# Patient Record
Sex: FEMALE | Race: WHITE
[De-identification: names, ages, dates, MRNs, and addresses within clinical notes are randomized per-mention and may not be internally consistent; named-entity substitution may affect disease eponyms.]

---

## 2020-01-23 LAB
ABSOLUTE BASOPHILS: 0.1 THOU/UL (ref 0–0.2)
ABSOLUTE EOSINOPHILS: 0.1 THOU/UL (ref 0–0.7)
ABSOLUTE MONOCYTES: 0.5 THOU/UL (ref 0–1.2)
ALBUMIN SERPL-MCNC: 3.7 G/DL (ref 3.4–5)
ALP SERPL-CCNC: 122 U/L (ref 46–116)
ALT SERPL-CCNC: 26 U/L (ref 30–65)
ANION GAP SERPL CALC-SCNC: 7 MMOL/L (ref 7–16)
APTT BLD: 29 SECONDS (ref 25–31.3)
AST SERPL-CCNC: 16 U/L (ref 15–37)
BASOPHILS NFR BLD AUTO: 1.2 %
BILIRUB SERPL-MCNC: 0.4 MG/DL
BUN SERPL-MCNC: 17 MG/DL (ref 7–18)
CALCIUM SERPL-MCNC: 9.8 MG/DL (ref 8.5–10.1)
CHLORIDE SERPL-SCNC: 105 MMOL/L (ref 98–107)
CO2 SERPL-SCNC: 29 MMOL/L (ref 21–32)
CREAT SERPL-MCNC: 0.9 MG/DL (ref 0.6–1.3)
EOSINOPHIL NFR BLD: 1.8 %
ESR (SEDRATE): 10 MM/HR (ref 0–30)
GLUCOSE SERPL-MCNC: 99 MG/DL (ref 70–99)
GRANULOCYTES NFR BLD MANUAL: 60.5 %
HCT VFR BLD CALC: 43.9 % (ref 37–47)
HGB BLD-MCNC: 14.8 GM/DL (ref 12–15)
INR PPP: 1
LYMPHOCYTES # BLD: 2.1 THOU/UL (ref 0.8–5.3)
LYMPHOCYTES NFR BLD AUTO: 29.1 %
MCH RBC QN AUTO: 28.2 PG (ref 26–34)
MCHC RBC AUTO-ENTMCNC: 33.7 G/DL (ref 28–37)
MCV RBC: 83.8 FL (ref 80–100)
MONOCYTES NFR BLD: 7.4 %
MPV: 7 FL. (ref 7.2–11.1)
NEUTROPHILS # BLD: 4.5 THOU/UL (ref 1.6–8.1)
NUCLEATED RBCS: 0 /100WBC
PLATELET COUNT*: 302 THOU/UL (ref 150–400)
POTASSIUM SERPL-SCNC: 3.9 MMOL/L (ref 3.5–5.1)
PROT SERPL-MCNC: 7.5 G/DL (ref 6.4–8.2)
PROTHROMBIN TIME: 10.7 SECONDS (ref 9.2–11.5)
RBC # BLD AUTO: 5.24 MIL/UL (ref 4.2–5)
RDW-CV: 12.9 % (ref 10.5–14.5)
SODIUM SERPL-SCNC: 141 MMOL/L (ref 136–145)
WBC # BLD AUTO: 7.4 THOU/UL (ref 4–11)

## 2020-01-23 NOTE — EKG
[place] [address]
Phone:  [phone]                     ELECTROCARDIOGRAM REPORT      
_______________________________________________________________________________
 
Name:       LOCO SANCHEZ               Room:                      PRE Ariella 
M.R.#:  [id number]      Account #:      [id number]  
Admission:               Attend Phys:    Raj Duron, 
Discharge:               Date of Birth:  [birth year]  
         Report #: [id number]
    [id number]
_______________________________________________________________________________
THIS REPORT FOR:  //name//                      
 
                          [facility]
                                       
Test Date:    2020               Test Time:    09:21:05
Pat Name:     LOCO SANCHEZ           Department:   
Patient ID:   [id number]            Room:          
Gender:       F                        Technician:   
:          1960               Requested By: Raj Duron
Order Number: [id number]    Fifi MD:   Abdullahi Mota
                                 Measurements
Intervals                              Axis          
Rate:         74                       P:            25
VA:           141                      QRS:          -68
QRSD:         98                       T:            59
QT:           409                                    
QTc:          454                                    
                           Interpretive Statements
Sinus rhythm
LAD, consider left anterior fascicular block
RSR' in V1 or V2, right VCD 
No previous ECG available for comparison
 
Electronically Signed On 2020 10:30:46 CST by Abdullahi Mota
[email or web address]
 
 
 
 
 
 
 
 
 
 
 
 
 
 
 
 
 
 
  <ELECTRONICALLY SIGNED>
                                           By: Abdullahi Mota MD, [facility]      
  20     1030
D: 20   _____________________________________
T: 20   Abdullahi Mota MD, FACC        /EPI

## 2020-01-24 LAB
EST. AVERAGE GLUCOSE BLD GHB EST-MCNC: 114 MG/DL
GLYCOHEMOGLOBIN (HGB A1C): 5.6 % (ref 4.8–5.6)

## 2020-01-30 ENCOUNTER — HOSPITAL ENCOUNTER (INPATIENT)
Dept: HOSPITAL 96 - M.TBA | Age: 60
LOS: 1 days | Discharge: HOME HEALTH | DRG: 470 | End: 2020-01-31
Attending: INTERNAL MEDICINE | Admitting: INTERNAL MEDICINE
Payer: COMMERCIAL

## 2020-01-30 VITALS — DIASTOLIC BLOOD PRESSURE: 67 MMHG | SYSTOLIC BLOOD PRESSURE: 126 MMHG

## 2020-01-30 VITALS — DIASTOLIC BLOOD PRESSURE: 75 MMHG | SYSTOLIC BLOOD PRESSURE: 137 MMHG

## 2020-01-30 VITALS — WEIGHT: 145 LBS | BODY MASS INDEX: 28.47 KG/M2 | HEIGHT: 60 IN

## 2020-01-30 VITALS — SYSTOLIC BLOOD PRESSURE: 136 MMHG | DIASTOLIC BLOOD PRESSURE: 73 MMHG

## 2020-01-30 VITALS — DIASTOLIC BLOOD PRESSURE: 82 MMHG | SYSTOLIC BLOOD PRESSURE: 128 MMHG

## 2020-01-30 VITALS — SYSTOLIC BLOOD PRESSURE: 112 MMHG | DIASTOLIC BLOOD PRESSURE: 57 MMHG

## 2020-01-30 VITALS — DIASTOLIC BLOOD PRESSURE: 72 MMHG | SYSTOLIC BLOOD PRESSURE: 132 MMHG

## 2020-01-30 DIAGNOSIS — Z88.0: ICD-10-CM

## 2020-01-30 DIAGNOSIS — M16.12: Primary | ICD-10-CM

## 2020-01-30 DIAGNOSIS — Z79.01: ICD-10-CM

## 2020-01-30 DIAGNOSIS — Z72.89: ICD-10-CM

## 2020-01-30 DIAGNOSIS — Z87.891: ICD-10-CM

## 2020-01-30 PROCEDURE — 0SRB03Z REPLACEMENT OF LEFT HIP JOINT WITH CERAMIC SYNTHETIC SUBSTITUTE, OPEN APPROACH: ICD-10-PCS | Performed by: ORTHOPAEDIC SURGERY

## 2020-01-30 NOTE — OP
[address]  [address]                    OPERATIVE REPORT              
_______________________________________________________________________________
 
Name:       LOCO SANCHEZ DEVANG               Room:           [address] [place]  
[facility]#:  [id number]      Account #:      [id number]  
Admission:  01/30/20     Attend Phys:    GUANACO Aj
Discharge:               Date of Birth:  [birth year]  
         Report #: [id number]
                                                                     [id number]  
_______________________________________________________________________________
THIS REPORT FOR:  //name//                      
 
CC: Raj Light
 
DATE OF SERVICE:  01/30/2020
 
 
PREOPERATIVE DIAGNOSIS:  Left hip severe osteoarthritis.
 
POSTOPERATIVE DIAGNOSIS:  Left hip severe osteoarthritis.
 
OPERATION PERFORMED:  Left total hip arthroplasty.
 
SURGEON:  Raj Duron DO
 
FIRST ASSISTANT:  David Morris DO
 
SECOND ASSISTANT:  Samson Webb DO.
 
ESTIMATED BLOOD LOSS:  300 mL.
 
ANTIBIOTICS:  600 mg of clindamycin given within 1 hour of skin incision.
 
ANESTHESIA:  General plus local anesthetic injected around the operative field.
 
DRAINS:  None.
 
SPECIMENS:  None.
 
COMPLICATIONS:  None.
 
CONDITION:  Stable.
 
DISPOSITION:  PACU to joint and spine floor.
 
OPERATIVE IMPLANTS:  Include the MicroPort total hip arthroplasty system with
the following components:
1.  A Profemur preserve classic stem size 3 with 8-degree varus neck.
2.  A 50 mm prime Biofoam quad shell.
3.  A 36 mm prime A class XLPE liner.
4.  A size 36 mm Biolox delta femoral head component with a long neck length
(+3.5 mm).
 
OPERATIVE INDICATIONS:  The patient is a pleasant 59-year-old female who
 
 
 
[facility] 
[address] [place]  [address]                    OPERATIVE REPORT              
_______________________________________________________________________________
 
Name:       LOCO SANCHEZ DEVANG               Room:           [address] [place]  
[facility]#:  [id number]      Account #:      [id number]  
Admission:  01/30/20     Attend Phys:    GUANACO Aj
Discharge:               Date of Birth:  [birth year]  
         Report #: [id number]
                                                                     [id number]  
_______________________________________________________________________________
reported to the Orthopedic Clinic today for left hip and knee pain.  Radiographs
were obtained and showed severe osteoarthritic changes of both left knee and hip
as well as the right side.  She had tried and failed conservative treatment over
the last following years including activity modifications, anti-inflammatories,
and exercises.  Despite trying these things, she continued to have severe pain
which is now beginning to interfere with her quality of life and just making it
very difficult for her to ambulate.  We discussed with her treatment options. 
We reviewed the risks, indications, and treatment alternatives and ultimately
decided on left total hip arthroplasty.  Her informed consent was signed.
 
DESCRIPTION OF PROCEDURE:  The patient was taken to the operating suite and
placed on the operating table in supine position.  General anesthesia was then
induced.  She was then placed in the lateral decubitus position with the left
side up.  The patient was then secured to the pegboard, and bony prominences
were well padded and axillary roll was placed.  Left hip region was then
sterilely prepped and draped free in the usual fashion.  A timeout was then
performed to confirm that the safety checklist had been completed and all the OR
personnel were in agreement.  A standard anterolateral curvilinear incision was
marked out over the lateral aspect of the hip, approximately 12 cm in length
over the anterior aspect of the greater trochanter extending in line with the
femur.  A sharp dissection was then carried down through the skin and
subcutaneous tissue.  Electrocautery was used to maintain hemostasis.  The
tensor fascia was identified and was cleared with a key elevator.  The fascia
was then incised and was split in line with the incision both proximally and
distally.  The Charnley retractor was then placed.  The gluteus medius tendon
was then identified.  Anterior third of the tendon was split to the tip of the
greater trochanter.  The anterior muscle fibers were then also split and
retracted cephalad.  This exposed the underlying capsule, and the leg at this
time was externally rotated and dropped into the side bag.  H capsulotomy was
then performed further exposing the femoral head and neck.  The leg was brought
into further external rotation and flexion dislocating the femoral head and
neck.  The capsule was further retracted to expose the lesser trochanter.  The
femoral neck cut was then made just above the lesser trochanter extending to the
saddle region.  Femoral head and neck were then removed.  The leg was then
brought back on the table exposing the acetabulum.  The remaining labral tissue
was excised.  There were large osteophytes on the superior and inferior rim of
the acetabulum, and these were removed with a rongeur.  The retractors were then
placed exposing the acetabulum, and the acetabulum was then sequentially reamed
up to a size 50.  There was good bleeding bone throughout the acetabulum at this
time.  A trial 50 mm shell was then placed to confirm appropriate version.  This
was then removed.  The wound was copiously irrigated.  The final shell was then
impacted in the appropriate positioning making sure the appropriate version and
abduction was confirmed by direct visualization as well as utilizing the
external guide.  The shell had excellent purchase within the bone.  We then
proceeded to the femoral portion of the procedure.  The leg was once again
externally rotated and flexed and dropped into the bag.  A cobra retractor was
 
 
 
[address]  [address]                    OPERATIVE REPORT              
_______________________________________________________________________________
 
Name:       LOCO SANCHEZ               Room:           [address] [place]  
[facility]#:  [id number]      Account #:      [id number]  
Admission:  01/30/20     Attend Phys:    GUANACO Aj
Discharge:               Date of Birth:  [birth year]  
         Report #: [id number]
                                                                     [id number]  
_______________________________________________________________________________
placed.  We then gained access to the intramedullary canal of the femur with a
box osteotome followed by sequential reaming.  We then sequentially broached the
femur up to a size 3 with the Profemur preserve stem.  This gave an excellent
fit and fill within the proximal femur and also it was held in the appropriate
anteversion.  Multiple trials were then performed with a different size neck
lengths and offsets, and ultimately the high offset and high neck length gave an
excellent stability through range of motion.  Intraoperative x-rays were then
taken at this time and confirmed that all the components were in the appropriate
positioning and of the appropriate size.  The trial components were then
dislocated and removed.  The final femoral stem component was then placed
followed by impaction of the final femoral head onto the Tan taper.  The hip
was then finally reduced and once again had excellent stability throughout range
of motion and minimal shuck.  A layered closure was then performed beginning
with #1 Vicryl suture in a figure-of-eight fashion on the capsular layer.  The
gluteus medius tendon was then re-approximated and repaired to the greater
trochanter using FiberWire suture in a figure-of-eight fashion.  This was then
oversewn with a #1 Vicryl suture.  The tensor fascia was then re-approximated
with a #1 Vicryl suture in a figure-of-eight fashion followed by 2-0 Vicryl in a
buried fashion.  A 3-0 Stratafix was then used on the subcuticular layer and
skin glue was applied to the incision and allowed to dry, followed by
application of a Mepilex dressing.  Abduction pillow was placed.  The patient
was transferred to the [facility] and awakened from general anesthetic in stable
condition with no apparent complications.  Sponge and needle counts were
reported correct x 2 per the OR personnel.
 
ATTESTATION:  Dr. Duron was present for all critical aspects of surgery.
 
 
 
 
 
 
 
 
 
 
 
 
 
 
 
 
 
 
<ELECTRONICALLY SIGNED>
                                        By:  Raj Duron DO       
01/30/20     1304
D: 01/30/20 1149_______________________________________
T: 01/30/20 1220Raj Duron DO          /nt

## 2020-01-31 VITALS — DIASTOLIC BLOOD PRESSURE: 61 MMHG | SYSTOLIC BLOOD PRESSURE: 107 MMHG

## 2020-01-31 VITALS — DIASTOLIC BLOOD PRESSURE: 54 MMHG | SYSTOLIC BLOOD PRESSURE: 100 MMHG

## 2020-01-31 VITALS — SYSTOLIC BLOOD PRESSURE: 129 MMHG | DIASTOLIC BLOOD PRESSURE: 90 MMHG

## 2020-01-31 VITALS — SYSTOLIC BLOOD PRESSURE: 107 MMHG | DIASTOLIC BLOOD PRESSURE: 61 MMHG

## 2020-01-31 LAB
ANION GAP SERPL CALC-SCNC: 6 MMOL/L (ref 7–16)
BUN SERPL-MCNC: 18 MG/DL (ref 7–18)
CALCIUM SERPL-MCNC: 8.4 MG/DL (ref 8.5–10.1)
CHLORIDE SERPL-SCNC: 109 MMOL/L (ref 98–107)
CO2 SERPL-SCNC: 26 MMOL/L (ref 21–32)
CREAT SERPL-MCNC: 0.9 MG/DL (ref 0.6–1.3)
GLUCOSE SERPL-MCNC: 126 MG/DL (ref 70–99)
HCT VFR BLD CALC: 31.3 % (ref 37–47)
HGB BLD-MCNC: 10.5 GM/DL (ref 12–15)
POTASSIUM SERPL-SCNC: 4 MMOL/L (ref 3.5–5.1)
SODIUM SERPL-SCNC: 141 MMOL/L (ref 136–145)

## 2020-05-21 LAB
ABSOLUTE BASOPHILS: 0.1 THOU/UL (ref 0–0.2)
ABSOLUTE EOSINOPHILS: 0.1 THOU/UL (ref 0–0.7)
ABSOLUTE MONOCYTES: 0.5 THOU/UL (ref 0–1.2)
ALBUMIN SERPL-MCNC: 3.6 G/DL (ref 3.4–5)
ALP SERPL-CCNC: 134 U/L (ref 46–116)
ALT SERPL-CCNC: 26 U/L (ref 30–65)
ANION GAP SERPL CALC-SCNC: 7 MMOL/L (ref 7–16)
APTT BLD: 29.7 SECONDS (ref 25–31.3)
AST SERPL-CCNC: 21 U/L (ref 15–37)
BASOPHILS NFR BLD AUTO: 1.2 %
BILIRUB SERPL-MCNC: 0.3 MG/DL
BUN SERPL-MCNC: 18 MG/DL (ref 7–18)
CALCIUM SERPL-MCNC: 9.4 MG/DL (ref 8.5–10.1)
CHLORIDE SERPL-SCNC: 103 MMOL/L (ref 98–107)
CO2 SERPL-SCNC: 29 MMOL/L (ref 21–32)
CREAT SERPL-MCNC: 0.8 MG/DL (ref 0.6–1.3)
EOSINOPHIL NFR BLD: 1.9 %
ESR (SEDRATE): 6 MM/HR (ref 0–30)
GLUCOSE SERPL-MCNC: 101 MG/DL (ref 70–99)
GRANULOCYTES NFR BLD MANUAL: 57.5 %
HCT VFR BLD CALC: 44 % (ref 37–47)
HGB BLD-MCNC: 14.7 GM/DL (ref 12–15)
INR PPP: 1.1
LYMPHOCYTES # BLD: 2.3 THOU/UL (ref 0.8–5.3)
LYMPHOCYTES NFR BLD AUTO: 32.7 %
MCH RBC QN AUTO: 26.8 PG (ref 26–34)
MCHC RBC AUTO-ENTMCNC: 33.3 G/DL (ref 28–37)
MCV RBC: 80.5 FL (ref 80–100)
MONOCYTES NFR BLD: 6.7 %
MPV: 7.3 FL. (ref 7.2–11.1)
NEUTROPHILS # BLD: 4 THOU/UL (ref 1.6–8.1)
NUCLEATED RBCS: 0 /100WBC
PLATELET COUNT*: 285 THOU/UL (ref 150–400)
POTASSIUM SERPL-SCNC: 4.6 MMOL/L (ref 3.5–5.1)
PROT SERPL-MCNC: 7.4 G/DL (ref 6.4–8.2)
PROTHROMBIN TIME: 10.8 SECONDS (ref 9.2–11.5)
RBC # BLD AUTO: 5.47 MIL/UL (ref 4.2–5)
RDW-CV: 14 % (ref 10.5–14.5)
SODIUM SERPL-SCNC: 139 MMOL/L (ref 136–145)
WBC # BLD AUTO: 6.9 THOU/UL (ref 4–11)

## 2020-05-28 ENCOUNTER — HOSPITAL ENCOUNTER (OUTPATIENT)
Dept: HOSPITAL 96 - M.TBA | Age: 60
Setting detail: OBSERVATION
LOS: 1 days | Discharge: HOME | End: 2020-05-29
Attending: INTERNAL MEDICINE | Admitting: INTERNAL MEDICINE
Payer: COMMERCIAL

## 2020-05-28 VITALS — WEIGHT: 145 LBS | BODY MASS INDEX: 28.47 KG/M2 | HEIGHT: 60 IN

## 2020-05-28 VITALS — SYSTOLIC BLOOD PRESSURE: 109 MMHG | DIASTOLIC BLOOD PRESSURE: 61 MMHG

## 2020-05-28 VITALS — SYSTOLIC BLOOD PRESSURE: 124 MMHG | DIASTOLIC BLOOD PRESSURE: 74 MMHG

## 2020-05-28 VITALS — SYSTOLIC BLOOD PRESSURE: 146 MMHG | DIASTOLIC BLOOD PRESSURE: 67 MMHG

## 2020-05-28 DIAGNOSIS — M21.062: ICD-10-CM

## 2020-05-28 DIAGNOSIS — M17.12: Primary | ICD-10-CM

## 2020-05-28 DIAGNOSIS — Z87.891: ICD-10-CM

## 2020-05-29 VITALS — DIASTOLIC BLOOD PRESSURE: 96 MMHG | SYSTOLIC BLOOD PRESSURE: 121 MMHG

## 2020-05-29 VITALS — DIASTOLIC BLOOD PRESSURE: 48 MMHG | SYSTOLIC BLOOD PRESSURE: 112 MMHG

## 2020-05-29 VITALS — SYSTOLIC BLOOD PRESSURE: 126 MMHG | DIASTOLIC BLOOD PRESSURE: 60 MMHG

## 2020-05-29 VITALS — DIASTOLIC BLOOD PRESSURE: 60 MMHG | SYSTOLIC BLOOD PRESSURE: 126 MMHG

## 2020-05-29 LAB
HCT VFR BLD CALC: 38.9 % (ref 37–47)
HGB BLD-MCNC: 12.7 GM/DL (ref 12–15)

## 2020-05-29 NOTE — OP
[address]  [address]                    OPERATIVE REPORT              
_______________________________________________________________________________
 
Name:       LOCO SANCHEZ               Room:           [address] Ariella [facility]#:  [id number]      Account #:      [id number]  
Admission:  05/28/20     Attend Phys:    GUANACO Aj
Discharge:               Date of Birth:  [birth year]  
         Report #: [id number]
                                                                     [id number]  
_______________________________________________________________________________
THIS REPORT FOR:  //name//                      
 
cc:  Memo Jaffe MD, Ram MD                                                       ~
THIS REPORT FOR:  //name//                      
 
CC: Raj Light
 
DICTATED BY: Krunal Duncan DO
 
DATE OF SERVICE:  05/28/2020
 
 
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
deformity.
 
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
deformity.
 
PROCEDURE PERFORMED:  Left total knee arthroplasty utilizing the MicroPort
Evolution total knee system with the following components:
1.  Size 3 left femoral component.
2.  Size 4 left tibial baseplate.
3.  Size 12 medial pivot polyethylene component.
4.  Size 32 mm patella.
 
SURGEON:  Raj Duron DO
 
ASSISTANTS:  Krunal Duncan DO and Tanner Gallagher DO
 
ANESTHESIA:  General.
 
ESTIMATED BLOOD LOSS:  125 mL.
 
ANTIBIOTICS:  Ancef 1 g IV preoperatively.
 
TOURNIQUET:  250 mmHg for 60 minutes.
 
SPECIMENS:  None.
 
COMPLICATIONS:  None.
 
CONDITION:  The patient is stable to PACU.
 
 
 
 
[address]  [address]                    OPERATIVE REPORT              
_______________________________________________________________________________
 
Name:       LOCO SANCHEZ               [address]           [address]  [id number]      Account #:      [id number]  
Admission:  05/28/20     Attend Phys:    GUANACO Aj
Discharge:               Date of Birth:  [birth year]  
         Report #: [id number]
                                                                     [id number]  
_______________________________________________________________________________
INDICATIONS FOR PROCEDURE:  The patient is a pleasant 59-year-old female who has
been seen and examined in the outpatient orthopedic clinic for left knee pain. 
Radiographs demonstrate a valgus deformity with advanced degenerative joint
disease with complete loss of joint space, osteophytic lipping and subchondral
sclerosis.  Physical exam shows partially correctable valgus deformity as well
as a 5-degree extension lag.  She tried and failed conservative treatment
including anti-inflammatories, activity modification and intra-articular steroid
injections.  Total knee arthroplasty was discussed with the patient and she
ultimately wished to proceed with this.  The risks, benefits, alternatives and
complications of this procedure were discussed and the patient wished to
proceed.
 
DESCRIPTION OF PROCEDURE:  The patient was seen and examined in the preoperative
holding area.  The correct operative extremity was marked.  Written consent was
obtained for the procedure.  The patient was then transferred to the operating
room and placed supine on the operating room table.  She was given the benefit
of general anesthesia.  A well-padded tourniquet was placed to the left thigh. 
Left lower extremity was then prepped and draped in the usual sterile fashion. 
Timeout was performed to verify the correct patient, procedure and operative
extremity and all were in agreement.  The tourniquet was then inflated to 250
mmHg.  Standard midline incision was made over the left knee.  Sharp dissection
was carried down to the level of the joint capsule.  Next, a new knife was used
to make a standard medial parapatellar arthrotomy.  Medial periosteal sleeve was
developed.  The fat pad was excised.  The patella was everted.  The anterior
horns of the medial and lateral menisci were excised.  The anterior cruciate
ligament and posterior cruciate ligament were also excised at this time.  There
was noted to be complete eburnation of the bone edges as well as multiple
periarticular osteophytes.  Next, the femoral intramedullary guide was inserted
into the femur.  This was set at 5 degrees of valgus.  This was pinned into
position.  The distal femur cut was performed in standard fashion.  The cutting
block and excess bone were then removed.  The femur was sized and noted to be a
size 3 femur.  This was pinned into appropriate position with 3 degrees of
external rotation, referencing off the posterior condylar axis.  The size 3,
4-in-1 cutting block was then placed on the femur.  The anterior, posterior and
chamfer cuts were then performed in standard fashion.  The block was removed. 
All excess bone was removed.  All remaining osteophytes of the femur were
removed.  Attention was turned to the tibia.  The extramedullary tibial guide
was placed and pinned into position.  An angiogram was then used to verify the
correct cut depth.  This was pinned into position.  The tibial cut was performed
in the standard fashion with caution to protect all ligament structures.  The
excess bone was removed.  The remainder of the medial and lateral menisci were
then excised.  The knee was brought out into extension and a size 10 spacer
block was inserted, which was noted to have full extension as well as excellent
balancing.  The knee was then brought back into flexion and the tibia was
trialed.  This was noted to be a size 4 tibia.  The size 4 tibial trial was
pinned into position.  The size 3 femur was put [place] [address]                    OPERATIVE REPORT              
_______________________________________________________________________________
 
Name:       LOCO SANCHEZ               Room:           [address]#:  [id number]      Account #:      [id number]  
Admission:  05/28/20     Attend Phys:    GUANACO Aj
Discharge:               Date of Birth:  [birth year]  
         Report #: [id number]
                                                                     [id number]  
_______________________________________________________________________________
cut was made and the trochlear insert was inserted.  A size 10 mm trial spacer
was then inserted.  The knee was taken through range of motion and noted to have
excellent range of motion and was very well balanced.  The patella was then
reamed in the standard fashion.  The peg holes were then drilled.  This was
measured to be a size 32 mm patella.  The trial patella was inserted.  The knee
was once again taken through range of motion.  There was noted to be full range
of motion with excellent patellar tracking.  The trial patella poly and femur
were removed.  The tibia was reamed and broached in standard fashion.  Trial
tibia was removed.  The knee was then thoroughly irrigated with pulsatile
lavage.  Cement was mixed on the back table in standard fashion.  The final
components were then cemented into position with a size 12 mm trial poly
inserted for compression.  The knee was held into extension to allow the cement
to harden.  All excess cement was removed.  After cement hardening, the knee was
once again trialled with the size 12 polyethylene component.  This was noted to
have full range of motion and excellent stability throughout range of motion. 
It was well balanced.  The size 12 final medial pivot polyethylene component was
then inserted.  Tourniquet was deflated.  There was minimal bleeding.  The knee
was once again thoroughly irrigated with pulsatile lavage.  The knee was
injected with 120 mL of the orthopedic cocktail.  The capsule was then closed
with a #1 Vicryl in a figure-of-eight fashion followed by a running Quill
suture.  Subcutaneous layer was closed with 2-0 Vicryl in simple interrupted and
inverted fashion followed by running 3-0 Stratafix on the skin.  Dermabond skin
glue was applied.  Sterile dressing was applied to the left leg.  The patient
was then awakened from anesthesia and transferred to the PACU in stable
condition.  The patient tolerated the procedure well and there were no
complications.
 
 
 
 
 
 
 
 
 
 
 
 
 
 
 
 
 
 
<ELECTRONICALLY SIGNED>
                                        By:  Michael Zayas DO              
05/29/20     1114
D: 05/28/20 1045_______________________________________
T: 05/28/20 1125Raj Duron DO          /nt

## 2020-12-08 ENCOUNTER — HOSPITAL ENCOUNTER (OUTPATIENT)
Dept: HOSPITAL 96 - M.CT | Age: 60
End: 2020-12-08
Attending: ORTHOPAEDIC SURGERY
Payer: COMMERCIAL

## 2020-12-08 DIAGNOSIS — Z96.642: Primary | ICD-10-CM
